# Patient Record
Sex: MALE | Race: WHITE | NOT HISPANIC OR LATINO | Employment: UNEMPLOYED | URBAN - METROPOLITAN AREA
[De-identification: names, ages, dates, MRNs, and addresses within clinical notes are randomized per-mention and may not be internally consistent; named-entity substitution may affect disease eponyms.]

---

## 2017-04-07 ENCOUNTER — HOSPITAL ENCOUNTER (OUTPATIENT)
Dept: INTERVENTIONAL RADIOLOGY/VASCULAR | Facility: HOSPITAL | Age: 37
Discharge: HOME | End: 2017-04-07
Attending: INTERNAL MEDICINE | Admitting: INTERNAL MEDICINE

## 2017-07-24 ENCOUNTER — HOSPITAL ENCOUNTER (OUTPATIENT)
Dept: CARDIOLOGY | Facility: HOSPITAL | Age: 37
Discharge: HOME | End: 2017-07-24
Attending: PHYSICIAN ASSISTANT | Admitting: PHYSICIAN ASSISTANT

## 2017-08-08 ENCOUNTER — HOSPITAL ENCOUNTER (OUTPATIENT)
Dept: PHYSICAL THERAPY | Facility: HOSPITAL | Age: 37
Setting detail: RECURRING SERIES
Discharge: HOME | End: 2017-09-12
Attending: PHYSICIAN ASSISTANT | Admitting: PHYSICIAN ASSISTANT

## 2018-02-22 ENCOUNTER — HOSPITAL ENCOUNTER (OUTPATIENT)
Dept: CARDIOLOGY | Facility: HOSPITAL | Age: 38
Discharge: HOME | End: 2018-02-22
Attending: INTERNAL MEDICINE | Admitting: INTERNAL MEDICINE

## 2018-05-24 ENCOUNTER — HOSPITAL ENCOUNTER (OUTPATIENT)
Dept: CARDIOLOGY | Facility: HOSPITAL | Age: 38
Discharge: HOME | End: 2018-05-24
Attending: PHYSICIAN ASSISTANT | Admitting: PHYSICIAN ASSISTANT

## 2018-12-31 ENCOUNTER — HOSPITAL ENCOUNTER (OUTPATIENT)
Dept: CARDIOLOGY | Facility: HOSPITAL | Age: 38
Discharge: HOME | End: 2018-12-31
Attending: PHYSICIAN ASSISTANT | Admitting: PHYSICIAN ASSISTANT

## 2019-09-30 RX ORDER — LANTHANUM CARBONATE 500 MG/1
TABLET, CHEWABLE ORAL EVERY 24 HOURS
COMMUNITY
Start: 2018-01-31 | End: 2019-10-08

## 2019-09-30 RX ORDER — SEVELAMER HYDROCHLORIDE 800 MG/1
TABLET, FILM COATED ORAL EVERY 8 HOURS
COMMUNITY
Start: 2018-01-31 | End: 2019-10-08

## 2019-09-30 RX ORDER — SODIUM BICARBONATE 650 MG/1
TABLET ORAL EVERY 12 HOURS
COMMUNITY
Start: 2018-01-31 | End: 2019-10-08

## 2019-09-30 RX ORDER — CINACALCET 90 MG/1
TABLET, FILM COATED ORAL EVERY 24 HOURS
COMMUNITY
Start: 2018-01-31 | End: 2019-10-08

## 2019-10-08 ENCOUNTER — OFFICE VISIT (OUTPATIENT)
Dept: CARDIOLOGY | Facility: CLINIC | Age: 39
End: 2019-10-08

## 2019-10-08 VITALS
HEIGHT: 65 IN | HEART RATE: 88 BPM | SYSTOLIC BLOOD PRESSURE: 98 MMHG | OXYGEN SATURATION: 98 % | BODY MASS INDEX: 25.49 KG/M2 | DIASTOLIC BLOOD PRESSURE: 60 MMHG | WEIGHT: 153 LBS

## 2019-10-08 DIAGNOSIS — N18.6 END-STAGE RENAL DISEASE (HCC): ICD-10-CM

## 2019-10-08 DIAGNOSIS — M79.602 ARM PAIN, LEFT: Primary | ICD-10-CM

## 2019-10-08 DIAGNOSIS — R07.9 CHEST PAIN, UNSPECIFIED TYPE: ICD-10-CM

## 2019-10-08 PROBLEM — R01.1 SYSTOLIC MURMUR: Status: ACTIVE | Noted: 2018-01-31

## 2019-10-08 PROCEDURE — 99213 OFFICE O/P EST LOW 20 MIN: CPT | Performed by: INTERNAL MEDICINE

## 2019-10-08 RX ORDER — MYCOPHENOLIC ACID 360 MG/1
360 TABLET, DELAYED RELEASE ORAL EVERY 12 HOURS SCHEDULED
COMMUNITY

## 2019-10-08 RX ORDER — TACROLIMUS 1 MG/1
0.5 CAPSULE ORAL 2 TIMES DAILY
COMMUNITY
End: 2022-09-28 | Stop reason: DRUGHIGH

## 2019-10-08 RX ORDER — SULFAMETHOXAZOLE AND TRIMETHOPRIM 400; 80 MG/1; MG/1
1 TABLET ORAL 2 TIMES DAILY
COMMUNITY
End: 2021-07-13

## 2019-10-08 RX ORDER — VALGANCICLOVIR 450 MG/1
450 TABLET, FILM COATED ORAL DAILY
COMMUNITY
End: 2021-07-13

## 2019-10-08 NOTE — PROGRESS NOTES
Encounter Date:10/08/2019  Plan seen 10/8/2018      Patient ID: Charanjit Parrish is a 39 y.o. male.    Chief Complaint:    Follow-up  Chest pain  Renal dysfunction  Systolic murmur    History of Present Illness  Patient had renal transplantation through [facility].  May 2019  Since I have last seen, the patient has been without any chest discomfort ,shortness of breath, palpitations, dizziness or syncope.  Denies having any headache ,abdominal pain ,nausea, vomiting , diarrhea constipation, loss of weight or loss of appetite.  Denies having any excessive bruising ,hematuria or blood in the stool.    Review of all systems negative except as indicated  Assessment and Plan       [[[[[[[[[[[[[[[[[[[  Impression  ==========   -chest pain -. -improved  Treadmill test 02/22/2018 is normal    -ESRD with left upper extremity fistula.  Patient is not on dialysis anymore.    -Status post renal transplantation through [facility].    -systolic murmur -likely due to AV fistula    - nonsmoker    - family history is negative for coronary artery disease    -no known allergies  ==============  Plan  ============  Chest pain has improved  EKG showed sinus rhythm without any ischemic changes  Medications were reviewed and updated.  Patient had renal transplantation.   no need for further cardiac workup at this time   followup in the office in 6 months.  [[[[[[[[[[[[[[[[           Diagnosis Plan   1. Arm pain, left     2. Chest pain, unspecified type     3. End-stage renal disease (CMS/[facility])     LAB RESULTS (LAST 7 DAYS)    CBC        BMP        CMP         BNP        TROPONIN        CoAg        Creatinine Clearance  CrCl cannot be calculated (No order found.).    ABG        Radiology  No radiology results for the last day                The following portions of the patient's history were reviewed and updated as appropriate: allergies, current medications, past family history, past medical history, past social history,  "past surgical history and problem list.    Review of Systems   Constitution: Negative for malaise/fatigue.   Cardiovascular: Negative for chest pain, leg swelling, palpitations and syncope.   Respiratory: Negative for shortness of breath.    Skin: Negative for rash.   Gastrointestinal: Negative for nausea and vomiting.   Neurological: Negative for dizziness, light-headedness and numbness.         Current Outpatient Medications:   •  Magnesium-Zinc (MAGNESIUM-CHELATED ZINC) 133.33-5 MG tablet, Take  by mouth., Disp: , Rfl:   •  mycophenolate (MYFORTIC) 360 MG tablet delayed-release EC tablet, Take 360 mg by mouth Every 12 (Twelve) Hours., Disp: , Rfl:   •  sulfamethoxazole-trimethoprim (BACTRIM,SEPTRA) 400-80 MG tablet, Take 1 tablet by mouth 2 (Two) Times a Day., Disp: , Rfl:   •  tacrolimus (PROGRAF) 1 MG capsule, Take 1 mg by mouth 2 (Two) Times a Day., Disp: , Rfl:   •  valGANciclovir (VALCYTE) 450 MG tablet, Take 450 mg by mouth Daily., Disp: , Rfl:     No Known Allergies    Family History   Problem Relation Age of Onset   • Stroke Father        Past Surgical History:   Procedure Laterality Date   • TRANSPLANTATION RENAL Right 05/07/2019       Past Medical History:   Diagnosis Date   • Chronic kidney disease        Family History   Problem Relation Age of Onset   • Stroke Father        Social History     Socioeconomic History   • Marital status: Single     Spouse name: Not on file   • Number of children: Not on file   • Years of education: Not on file   • Highest education level: Not on file   Tobacco Use   • Smoking status: Current Every Day Smoker     Types: Cigarettes         Procedures      Objective:       Physical Exam    BP 98/60 (BP Location: Right arm, Patient Position: Sitting, Cuff Size: Large Adult)   Pulse 88   Ht 165.1 cm (65\")   Wt 69.4 kg (153 lb)   SpO2 98%   BMI 25.46 kg/m²   The patient is alert, oriented and in no distress.    Vital signs as noted above.    Head and neck revealed no " carotid bruits or jugular venous distension.  No thyromegaly or lymphadenopathy is present.    Lungs clear.  No wheezing.  Breath sounds are normal bilaterally.    Heart normal first and second heart sounds.  Grade 2/6 to 3/6 systolic murmur.  no pericardial rub is present.  No gallop is present.    Abdomen soft and nontender.  No organomegaly is present.    Extremities revealed good peripheral pulses without any pedal edema.  Left upper extremity AV fistula in place.    Skin warm and dry.    Musculoskeletal system is grossly normal.    CNS grossly normal.

## 2020-06-11 ENCOUNTER — OFFICE VISIT (OUTPATIENT)
Dept: CARDIOLOGY | Facility: CLINIC | Age: 40
End: 2020-06-11

## 2020-06-11 VITALS
BODY MASS INDEX: 26.66 KG/M2 | HEART RATE: 81 BPM | SYSTOLIC BLOOD PRESSURE: 115 MMHG | WEIGHT: 160 LBS | HEIGHT: 65 IN | OXYGEN SATURATION: 97 % | DIASTOLIC BLOOD PRESSURE: 68 MMHG

## 2020-06-11 DIAGNOSIS — N18.6 END-STAGE RENAL DISEASE (HCC): Primary | ICD-10-CM

## 2020-06-11 DIAGNOSIS — R07.9 CHEST PAIN, UNSPECIFIED TYPE: ICD-10-CM

## 2020-06-11 PROCEDURE — 93000 ELECTROCARDIOGRAM COMPLETE: CPT | Performed by: INTERNAL MEDICINE

## 2020-06-11 PROCEDURE — 99213 OFFICE O/P EST LOW 20 MIN: CPT | Performed by: INTERNAL MEDICINE

## 2020-06-11 NOTE — PROGRESS NOTES
Encounter Date:06/11/2020  Last seen 10/8/2019      Patient ID: Charanjit Parrish is a 40 y.o. male.    Chief Complaint:    Chest pain  Renal dysfunction  Systolic murmur     History of Present Illness    Patient had renal transplantation through [facility][1].  May 2019    Since I have last seen, the patient has been without any chest discomfort ,shortness of breath, palpitations, dizziness or syncope.  Denies having any headache ,abdominal pain ,nausea, vomiting , diarrhea constipation, loss of weight or loss of appetite.  Denies having any excessive bruising ,hematuria or blood in the stool.     Review of all systems negative except as indicated  Assessment and Plan         [[[[[[[[[[[[[[[[[[[  Impression  ==========   -chest pain -. -improved  Treadmill test 02/22/2018 is normal     -ESRD with left upper extremity fistula.  Patient is not on dialysis anymore.  -Status post renal transplantation through [facility][1].  May 2019     -systolic murmur -likely due to AV fistula     - nonsmoker     - family history is negative for coronary artery disease     -no known allergies  ==============  Plan  ============  Chest pain has improved  EKG showed sinus rhythm without any ischemic changes  Medications were reviewed and updated.  Patient had renal transplantation.  no need for further cardiac workup at this time  followup in the office in  1 year unless he is having problems in the interim  [[[[[[[[[[[[[[[[                 Diagnosis Plan   1. End-stage renal disease (CMS/HCC)     2. Chest pain, unspecified type     LAB RESULTS (LAST 7 DAYS)    CBC        BMP        CMP         BNP        TROPONIN        CoAg        Creatinine Clearance  CrCl cannot be calculated (No successful lab value found.).    ABG        Radiology  No radiology results for the last day                The following portions of the patient's history were reviewed and updated as appropriate: allergies, current medications, past family  history, past medical history, past social history, past surgical history and problem list.    Review of Systems   Constitution: Negative for malaise/fatigue.   Cardiovascular: Negative for chest pain, leg swelling, palpitations and syncope.   Respiratory: Negative for shortness of breath.    Skin: Negative for rash.   Gastrointestinal: Negative for nausea and vomiting.   Neurological: Negative for dizziness, light-headedness and numbness.         Current Outpatient Medications:   •  Magnesium-Zinc (MAGNESIUM-CHELATED ZINC) 133.33-5 MG tablet, Take  by mouth., Disp: , Rfl:   •  mycophenolate (MYFORTIC) 360 MG tablet delayed-release EC tablet, Take 360 mg by mouth Every 12 (Twelve) Hours., Disp: , Rfl:   •  sulfamethoxazole-trimethoprim (BACTRIM,SEPTRA) 400-80 MG tablet, Take 1 tablet by mouth 2 (Two) Times a Day., Disp: , Rfl:   •  tacrolimus (PROGRAF) 1 MG capsule, Take 1 mg by mouth 2 (Two) Times a Day., Disp: , Rfl:   •  valGANciclovir (VALCYTE) 450 MG tablet, Take 450 mg by mouth Daily., Disp: , Rfl:     No Known Allergies    Family History   Problem Relation Age of Onset   • Stroke Father        Past Surgical History:   Procedure Laterality Date   • TRANSPLANTATION RENAL Right 05/07/2019       Past Medical History:   Diagnosis Date   • Chronic kidney disease        Family History   Problem Relation Age of Onset   • Stroke Father        Social History     Socioeconomic History   • Marital status: Single     Spouse name: Not on file   • Number of children: Not on file   • Years of education: Not on file   • Highest education level: Not on file   Tobacco Use   • Smoking status: Former Smoker     Types: Cigarettes   • Smokeless tobacco: Never Used           ECG 12 Lead  Date/Time: 6/11/2020 3:52 PM  Performed by: Ruthy Black MD  Authorized by: Ruthy Black MD   Comparison: compared with previous ECG   Similar to previous ECG  Comments: Normal sinus rhythm normal ECG 75/min normal axis normal intervals no  "ectopy no change from previous EKG              Objective:       Physical Exam    /68 (BP Location: Right arm, Patient Position: Sitting, Cuff Size: Large Adult)   Pulse 81   Ht 165.1 cm (65\")   Wt 72.6 kg (160 lb)   SpO2 97%   BMI 26.63 kg/m²   The patient is alert, oriented and in no distress.    Vital signs as noted above.    Head and neck revealed no carotid bruits or jugular venous distension.  No thyromegaly or lymphadenopathy is present.    Lungs clear.  No wheezing.  Breath sounds are normal bilaterally.    Heart normal first and second heart sounds.  Grade 2/6 to 3/6 systolic murmur..  No pericardial rub is present.  No gallop is present.    Abdomen soft and nontender.  No organomegaly is present.    Extremities revealed good peripheral pulses without any pedal edema.    Skin warm and dry.    Musculoskeletal system is grossly normal.    CNS grossly normal.        "

## 2021-07-13 ENCOUNTER — OFFICE VISIT (OUTPATIENT)
Dept: CARDIOLOGY | Facility: CLINIC | Age: 41
End: 2021-07-13

## 2021-07-13 VITALS
SYSTOLIC BLOOD PRESSURE: 113 MMHG | HEART RATE: 77 BPM | OXYGEN SATURATION: 96 % | WEIGHT: 142.25 LBS | DIASTOLIC BLOOD PRESSURE: 71 MMHG | HEIGHT: 65 IN | BODY MASS INDEX: 23.7 KG/M2

## 2021-07-13 DIAGNOSIS — Z94.0 HISTORY OF RENAL TRANSPLANTATION: ICD-10-CM

## 2021-07-13 DIAGNOSIS — N18.6 END-STAGE RENAL DISEASE (HCC): Primary | ICD-10-CM

## 2021-07-13 DIAGNOSIS — R07.9 CHEST PAIN, UNSPECIFIED TYPE: ICD-10-CM

## 2021-07-13 DIAGNOSIS — M79.602 ARM PAIN, LEFT: ICD-10-CM

## 2021-07-13 PROCEDURE — 99213 OFFICE O/P EST LOW 20 MIN: CPT | Performed by: INTERNAL MEDICINE

## 2021-07-13 PROCEDURE — 93000 ELECTROCARDIOGRAM COMPLETE: CPT | Performed by: INTERNAL MEDICINE

## 2021-07-13 RX ORDER — MONTELUKAST SODIUM 10 MG/1
1 TABLET ORAL DAILY
COMMUNITY
Start: 2021-07-05

## 2021-07-13 RX ORDER — ISAVUCONAZONIUM SULFATE 186 MG/1
1 CAPSULE ORAL DAILY
COMMUNITY
Start: 2021-07-06

## 2021-07-13 RX ORDER — MAGNESIUM OXIDE 400 MG/1
1 TABLET ORAL DAILY
COMMUNITY
Start: 2021-07-05

## 2021-07-13 NOTE — PROGRESS NOTES
Encounter Date:07/13/2021  Last seen 6/11/2020      Patient ID: Charanjit Parrish is a 41 y.o. male.    Chief Complaint:  Chest pain  Renal dysfunction  Systolic murmur     History of Present Illness     Since I have last seen, the patient has been without any chest discomfort ,shortness of breath, palpitations, dizziness or syncope.  Denies having any headache ,abdominal pain ,nausea, vomiting , diarrhea constipation, loss of weight or loss of appetite.  Denies having any excessive bruising ,hematuria or blood in the stool.    Review of all systems negative except as indicated.    Reviewed ROS.    Assessment and Plan         [[[[[[[[[[[[[[[[[[[  Impression  ==========   -chest pain -. -improved  Treadmill test 02/22/2018 is normal     -ESRD with left upper extremity fistula.  Patient is not on dialysis anymore.    -Status post renal transplantation through [facility].  May 2019     -systolic murmur -likely due to AV fistula     - nonsmoker     - family history is negative for coronary artery disease     -no known allergies  ==============  Plan  ============  Chest pain and left arm pain has improved  EKG showed sinus rhythm without any ischemic changes    Patient had renal transplantation.  Patient has AV fistula but not using it.    Medications are reviewed and updated.  no need for further cardiac workup at this time  followup in the office in  1 year unless he is having problems in the interim.    Further plan will depend on patient's progress.  [[[[[[[[[[[[[[[[                     Diagnosis Plan   1. End-stage renal disease (CMS/HCC)     2. Chest pain, unspecified type     3. Arm pain, left     4. History of renal transplantation     LAB RESULTS (LAST 7 DAYS)    CBC        BMP        CMP         BNP        TROPONIN        CoAg        Creatinine Clearance  CrCl cannot be calculated (No successful lab value found.).    ABG        Radiology  No radiology results for the last day                The  following portions of the patient's history were reviewed and updated as appropriate: allergies, current medications, past family history, past medical history, past social history, past surgical history and problem list.    Review of Systems   Constitutional: Negative for fever and malaise/fatigue.   HENT: Negative for congestion and hearing loss.    Eyes: Negative for double vision and visual disturbance.   Cardiovascular: Negative for chest pain, claudication, dyspnea on exertion, leg swelling and syncope.   Respiratory: Negative for cough and shortness of breath.    Endocrine: Negative for cold intolerance.   Skin: Negative for color change and rash.   Musculoskeletal: Negative for arthritis and joint pain.   Gastrointestinal: Negative for abdominal pain and heartburn.   Genitourinary: Negative for hematuria.   Neurological: Negative for excessive daytime sleepiness and dizziness.   Psychiatric/Behavioral: Negative for depression. The patient is not nervous/anxious.    All other systems reviewed and are negative.        Current Outpatient Medications:   •  Cresemba 186 MG capsule capsule, Take 1 capsule by mouth Daily. For 30 days, Disp: , Rfl:   •  magnesium oxide (MAG-OX) 400 MG tablet, Take 1 tablet by mouth Daily., Disp: , Rfl:   •  montelukast (SINGULAIR) 10 MG tablet, Take 1 tablet by mouth Daily., Disp: , Rfl:   •  mycophenolate (MYFORTIC) 360 MG tablet delayed-release EC tablet, Take 360 mg by mouth Every 12 (Twelve) Hours., Disp: , Rfl:   •  tacrolimus (PROGRAF) 1 MG capsule, Take 0.5 mg by mouth 2 (Two) Times a Day., Disp: , Rfl:     No Known Allergies    Family History   Problem Relation Age of Onset   • Stroke Father        Past Surgical History:   Procedure Laterality Date   • TRANSPLANTATION RENAL Right 05/07/2019       Past Medical History:   Diagnosis Date   • Chronic kidney disease        Family History   Problem Relation Age of Onset   • Stroke Father        Social History     Socioeconomic  "History   • Marital status: Single     Spouse name: Not on file   • Number of children: Not on file   • Years of education: Not on file   • Highest education level: Not on file   Tobacco Use   • Smoking status: Former Smoker     Types: Cigarettes   • Smokeless tobacco: Never Used   Vaping Use   • Vaping Use: Never used   Substance and Sexual Activity   • Alcohol use: Never   • Drug use: Never           ECG 12 Lead    Date/Time: 7/13/2021 1:52 PM  Performed by: Ruthy Black MD  Authorized by: Ruthy Black MD   Comparison: compared with previous ECG   Similar to previous ECG  Comparison to previous ECG: Normal sinus rhythm normal ECG 73/min normal axis normal intervals no ectopy no significant change from 6/11/2020                Objective:       Physical Exam    /71 (BP Location: Right arm, Patient Position: Sitting, Cuff Size: Adult)   Pulse 77   Ht 165.1 cm (65\")   Wt 64.5 kg (142 lb 4 oz)   SpO2 96%   BMI 23.67 kg/m²   The patient is alert, oriented and in no distress.    Vital signs as noted above.    Head and neck revealed no carotid bruits or jugular venous distension.  No thyromegaly or lymphadenopathy is present.    Lungs clear.  No wheezing.  Breath sounds are normal bilaterally.    Heart normal first and second heart sounds.  No murmur..  No pericardial rub is present.  No gallop is present.    Abdomen soft and nontender.  No organomegaly is present.    Extremities revealed good peripheral pulses without any pedal edema.  Left upper extremity AV fistula    Skin warm and dry.    Musculoskeletal system is grossly normal.    CNS grossly normal.    Reviewed and unchanged from last visit.        "

## 2022-09-23 NOTE — PROGRESS NOTES
Encounter Date:09/28/2022    Last seen 7/13/2021      Patient ID: Charanjit Parrish is a 42 y.o. male.    Chief Complaint:    Chest pain  Renal dysfunction  Systolic murmur     History of Present Illness     Since I have last seen, the patient has been without any chest discomfort ,shortness of breath, palpitations, dizziness or syncope.  Denies having any headache ,abdominal pain ,nausea, vomiting , diarrhea constipation, loss of weight or loss of appetite.  Denies having any excessive bruising ,hematuria or blood in the stool.    Review of all systems negative except as indicated.    Reviewed ROS.  Assessment and Plan         [[[[[[[[[[[[[[[[[[[  Impression  ==========   -chest pain -. -improved  Treadmill test 02/22/2018 is normal      -Status post renal transplantation through [facility].  May 2019    -History of ESRD with left upper extremity fistula.  Patient is not on dialysis anymore.    -systolic murmur -likely due to AV fistula     - nonsmoker     - family history is negative for coronary artery disease     -no known allergies  ==============  Plan  ============  Chest pain and left arm pain has improved  EKG showed sinus rhythm without any ischemic changes-9/28/2022     Patient had renal transplantation.  Patient has AV fistula but not using it.  Patient is off dialysis.     Medications are reviewed and updated.      followup in the office in  1 year unless he is having problems in the interim.     Further plan will depend on patient's progress.  [[[[[[[[[[[[[[[[                   Diagnosis Plan   1. End-stage renal disease (HCC)  ECG 12 Lead   2. Chest pain, unspecified type  ECG 12 Lead   3. History of renal transplantation  ECG 12 Lead   LAB RESULTS (LAST 7 DAYS)    CBC        BMP        CMP         BNP        TROPONIN        CoAg        Creatinine Clearance  CrCl cannot be calculated (No successful lab value found.).    ABG        Radiology  No radiology results for the last  day                The following portions of the patient's history were reviewed and updated as appropriate: allergies, current medications, past family history, past medical history, past social history, past surgical history and problem list.    Review of Systems   Constitutional: Negative for fever and malaise/fatigue.   Cardiovascular: Negative for chest pain, dyspnea on exertion and palpitations.   Respiratory: Negative for cough and shortness of breath.    Skin: Negative for rash.   Gastrointestinal: Negative for abdominal pain, nausea and vomiting.   Neurological: Negative for focal weakness and headaches.   All other systems reviewed and are negative.        Current Outpatient Medications:   •  Cresemba 186 MG capsule capsule, Take 1 capsule by mouth Daily. For 30 days, Disp: , Rfl:   •  magnesium oxide (MAG-OX) 400 MG tablet, Take 1 tablet by mouth Daily., Disp: , Rfl:   •  montelukast (SINGULAIR) 10 MG tablet, Take 1 tablet by mouth Daily., Disp: , Rfl:   •  mycophenolate (MYFORTIC) 360 MG tablet delayed-release EC tablet, Take 360 mg by mouth Every 12 (Twelve) Hours., Disp: , Rfl:   •  rosuvastatin (CRESTOR) 10 MG tablet, Take 10 mg by mouth every night at bedtime., Disp: , Rfl:   •  tacrolimus (PROGRAF) 0.5 MG capsule, TAKE 2 CAPSULES BY MOUTH EVERY MORNING AND 1 EVERY EVENING., Disp: , Rfl:     No Known Allergies    Family History   Problem Relation Age of Onset   • Stroke Father        Past Surgical History:   Procedure Laterality Date   • TRANSPLANTATION RENAL Right 05/07/2019       Past Medical History:   Diagnosis Date   • Chronic kidney disease        Family History   Problem Relation Age of Onset   • Stroke Father        Social History     Socioeconomic History   • Marital status: Single   Tobacco Use   • Smoking status: Former Smoker     Types: Cigarettes   • Smokeless tobacco: Never Used   Vaping Use   • Vaping Use: Never used   Substance and Sexual Activity   • Alcohol use: Never   • Drug use:  "Never           ECG 12 Lead    Date/Time: 9/28/2022 3:30 PM  Performed by: Ruthy Black MD  Authorized by: Ruthy Black MD   Comparison: compared with previous ECG   Similar to previous ECG  Comparison to previous ECG: Normal sinus rhythm nonspecific ST-T wave changes normal axis normal intervals no ectopy no significant change from 7/13/2021                Objective:       Physical Exam    /86 (BP Location: Left arm, Patient Position: Sitting, Cuff Size: Adult)   Pulse 78   Ht 165.1 cm (65\")   Wt 74 kg (163 lb 3.2 oz)   SpO2 97%   BMI 27.16 kg/m²   The patient is alert, oriented and in no distress.    Vital signs as noted above.    Head and neck revealed no carotid bruits or jugular venous distension.  No thyromegaly or lymphadenopathy is present.    Lungs clear.  No wheezing.  Breath sounds are normal bilaterally.    Heart normal first and second heart sounds.  Grade 2/6 systolic murmur..  No pericardial rub is present.  No gallop is present.    Abdomen soft and nontender.  No organomegaly is present.    Extremities revealed good peripheral pulses without any pedal edema.    Skin warm and dry.    Musculoskeletal system is grossly normal.    CNS grossly normal.    Reviewed and updated.        "

## 2022-09-28 ENCOUNTER — OFFICE VISIT (OUTPATIENT)
Dept: CARDIOLOGY | Facility: CLINIC | Age: 42
End: 2022-09-28

## 2022-09-28 VITALS
DIASTOLIC BLOOD PRESSURE: 86 MMHG | OXYGEN SATURATION: 97 % | HEART RATE: 78 BPM | HEIGHT: 65 IN | WEIGHT: 163.2 LBS | BODY MASS INDEX: 27.19 KG/M2 | SYSTOLIC BLOOD PRESSURE: 143 MMHG

## 2022-09-28 DIAGNOSIS — R07.9 CHEST PAIN, UNSPECIFIED TYPE: ICD-10-CM

## 2022-09-28 DIAGNOSIS — Z94.0 HISTORY OF RENAL TRANSPLANTATION: ICD-10-CM

## 2022-09-28 DIAGNOSIS — N18.6 END-STAGE RENAL DISEASE: Primary | ICD-10-CM

## 2022-09-28 PROCEDURE — 99213 OFFICE O/P EST LOW 20 MIN: CPT | Performed by: INTERNAL MEDICINE

## 2022-09-28 PROCEDURE — 93000 ELECTROCARDIOGRAM COMPLETE: CPT | Performed by: INTERNAL MEDICINE

## 2022-09-28 RX ORDER — TACROLIMUS 0.5 MG/1
CAPSULE ORAL
COMMUNITY
Start: 2022-09-17

## 2022-09-28 RX ORDER — ROSUVASTATIN CALCIUM 10 MG/1
10 TABLET, COATED ORAL
COMMUNITY
Start: 2022-08-21

## 2023-09-10 NOTE — PROGRESS NOTES
Encounter Date:09/28/2023      Last seen 9/28/2022    Patient ID: Charanjit Parrish is a 43 y.o. male.    Chief Complaint:    Chest pain  Renal dysfunction  Systolic murmur  Status post renal transplant     History of Present Illness     Since I have last seen, the patient has been without any chest discomfort ,shortness of breath, palpitations, dizziness or syncope.  Denies having any headache ,abdominal pain ,nausea, vomiting , diarrhea constipation, loss of weight or loss of appetite.  Denies having any excessive bruising ,hematuria or blood in the stool.    Review of all systems negative except as indicated.    Reviewed ROS.  Assessment and Plan         [[[[[[[[[[[[[[[[[[[  Impression  ==========   -chest pain -. -improved  Treadmill test 02/22/2018 is normal      -Status post renal transplantation through [facility].  May 2019     -History of ESRD with left upper extremity fistula.  (AV fistula was removed)  Patient is not on dialysis anymore.     -History of systolic murmur -likely due to AV fistula     - nonsmoker     - family history is negative for coronary artery disease     -no known allergies  ==============  Plan  ============  Chest pain and left arm pain has improved  EKG showed sinus rhythm without any ischemic changes-9/28/2023    Patient had renal transplantation.  Patient has AV fistula but not using it.  AV fistula was removed.  Patient is off dialysis.     Medications are reviewed and updated.     followup in the office in  1 year unless he is having problems in the interim.     Further plan will depend on patient's progress.    Reviewed and updated-9/28/2023  [[[[[[[[[[[[[[[[                        Diagnosis Plan   1. End-stage renal disease        2. Chest pain, unspecified type        3. History of renal transplantation        LAB RESULTS (LAST 7 DAYS)    CBC        BMP        CMP         BNP        TROPONIN        CoAg        Creatinine Clearance  CrCl cannot be calculated (No  successful lab value found.).    ABG        Radiology  No radiology results for the last day                The following portions of the patient's history were reviewed and updated as appropriate: allergies, current medications, past family history, past medical history, past social history, past surgical history, and problem list.    Review of Systems   Constitutional: Negative for malaise/fatigue.   Cardiovascular:  Negative for chest pain, dyspnea on exertion, leg swelling and palpitations.   Respiratory:  Negative for cough and shortness of breath.    Gastrointestinal:  Negative for abdominal pain, nausea and vomiting.   Neurological:  Negative for dizziness, focal weakness, headaches, light-headedness and numbness.   All other systems reviewed and are negative.      Current Outpatient Medications:     Cresemba 186 MG capsule capsule, Take 1 capsule by mouth Daily. For 30 days, Disp: , Rfl:     magnesium oxide (MAG-OX) 400 MG tablet, Take 1 tablet by mouth Daily., Disp: , Rfl:     montelukast (SINGULAIR) 10 MG tablet, Take 1 tablet by mouth Daily., Disp: , Rfl:     mycophenolate (MYFORTIC) 360 MG tablet delayed-release EC tablet, Take 360 mg by mouth Every 12 (Twelve) Hours., Disp: , Rfl:     rosuvastatin (CRESTOR) 10 MG tablet, Take 10 mg by mouth every night at bedtime., Disp: , Rfl:     tacrolimus (PROGRAF) 0.5 MG capsule, TAKE 2 CAPSULES BY MOUTH EVERY MORNING AND 1 EVERY EVENING., Disp: , Rfl:     No Known Allergies    Family History   Problem Relation Age of Onset    Stroke Father        Past Surgical History:   Procedure Laterality Date    TRANSPLANTATION RENAL Right 05/07/2019       Past Medical History:   Diagnosis Date    Chronic kidney disease        Family History   Problem Relation Age of Onset    Stroke Father        Social History     Socioeconomic History    Marital status: Single   Tobacco Use    Smoking status: Former     Types: Cigarettes    Smokeless tobacco: Never   Vaping Use    Vaping  Use: Never used   Substance and Sexual Activity    Alcohol use: Never    Drug use: Never           ECG 12 Lead    Date/Time: 9/28/2023 10:55 AM  Performed by: Rtuhy Black MD  Authorized by: Ruthy Black MD   Comparison: compared with previous ECG   Similar to previous ECG  Comparison to previous ECG: Sinus rhythm first-degree AV block nonspecific intraventricular conduction delay 70/min no ectopy no significant change from previous EKG.        Objective:       Physical Exam    There were no vitals taken for this visit.  The patient is alert, oriented and in no distress.    Vital signs as noted above.    Head and neck revealed no carotid bruits or jugular venous distension.  No thyromegaly or lymphadenopathy is present.    Lungs clear.  No wheezing.  Breath sounds are normal bilaterally.    Heart normal first and second heart sounds.  No murmur..  No pericardial rub is present.  No gallop is present.    Abdomen soft and nontender.  No organomegaly is present.    Extremities revealed good peripheral pulses without any pedal edema.    Skin warm and dry.    Musculoskeletal system is grossly normal.    CNS grossly normal.    Reviewed and updated.

## 2023-09-28 ENCOUNTER — OFFICE VISIT (OUTPATIENT)
Dept: CARDIOLOGY | Facility: CLINIC | Age: 43
End: 2023-09-28
Payer: MEDICAID

## 2023-09-28 VITALS
DIASTOLIC BLOOD PRESSURE: 87 MMHG | WEIGHT: 169 LBS | SYSTOLIC BLOOD PRESSURE: 138 MMHG | HEIGHT: 65 IN | HEART RATE: 72 BPM | BODY MASS INDEX: 28.16 KG/M2 | OXYGEN SATURATION: 97 %

## 2023-09-28 DIAGNOSIS — Z94.0 HISTORY OF RENAL TRANSPLANTATION: ICD-10-CM

## 2023-09-28 DIAGNOSIS — N18.6 END-STAGE RENAL DISEASE: Primary | ICD-10-CM

## 2023-09-28 DIAGNOSIS — R07.9 CHEST PAIN, UNSPECIFIED TYPE: ICD-10-CM

## 2023-09-28 RX ORDER — ROSUVASTATIN CALCIUM 20 MG/1
1 TABLET, COATED ORAL DAILY
COMMUNITY
Start: 2023-09-11

## 2023-09-28 RX ORDER — AMLODIPINE BESYLATE 5 MG/1
1 TABLET ORAL DAILY
COMMUNITY
Start: 2023-06-17

## 2024-10-29 NOTE — PROGRESS NOTES
Encounter Date:11/13/2024  Last seen 9/28/2023      Patient ID: Charanjit Parrish is a 44 y.o. male.    Chief Complaint:     Chest pain  Renal dysfunction  Systolic murmur  Status post renal transplant     History of Present Illness     Since I have last seen, the patient has been without any chest discomfort ,shortness of breath, palpitations, dizziness or syncope.  Denies having any headache ,abdominal pain ,nausea, vomiting , diarrhea constipation, loss of weight or loss of appetite.  Denies having any excessive bruising ,hematuria or blood in the stool.    Review of all systems negative except as indicated.    Reviewed ROS.    Assessment and Plan         [[[[[[[[[[[[[[[[[[[  Impression  ==========   -chest pain -. -improved  Treadmill test 02/22/2018 is normal      -Status post renal transplantation through [facility].  May 2019     -History of ESRD with left upper extremity fistula.  (AV fistula was removed)  Patient is not on dialysis anymore.     -History of systolic murmur -likely due to AV fistula    -Dyslipidemia hypertension     - nonsmoker     - family history is negative for coronary artery disease     -no known allergies  ==============  Plan  ============  Chest pain and left arm pain has improved  EKG showed sinus rhythm without any ischemic changes-9/28/2023     Patient had renal transplantation.  Patient has AV fistula but not using it.  AV fistula was removed.  Patient is off dialysis.    Dyslipidemia-on Crestor.    Hypertension-on amlodipine.    Medications are reviewed and updated.     followup in the office in  1 year unless he is having problems in the interim.     Further plan will depend on patient's progress.     Reviewed and updated 11/13/2024.  [[[[[[[[[[[[[[[[                  Diagnosis Plan   1. Chest pain, unspecified type        2. End-stage renal disease        3. Systolic murmur        LAB RESULTS (LAST 7 DAYS)    CBC        BMP        CMP         BNP        TROPONIN         CoAg        Creatinine Clearance  CrCl cannot be calculated (No successful lab value found.).    ABG        Radiology  No radiology results for the last day                The following portions of the patient's history were reviewed and updated as appropriate: allergies, current medications, past family history, past medical history, past social history, past surgical history, and problem list.    Review of Systems   Constitutional: Negative for malaise/fatigue.   Cardiovascular:  Negative for chest pain, dyspnea on exertion, leg swelling and palpitations.   Respiratory:  Negative for cough and shortness of breath.    Gastrointestinal:  Negative for abdominal pain, nausea and vomiting.   Neurological:  Negative for dizziness, focal weakness, headaches, light-headedness and numbness.   All other systems reviewed and are negative.      Current Outpatient Medications:     amLODIPine (NORVASC) 5 MG tablet, Take 1 tablet by mouth Daily., Disp: , Rfl:     Cresemba 186 MG capsule capsule, Take 1 capsule by mouth Daily. For 30 days, Disp: , Rfl:     magnesium oxide (MAG-OX) 400 MG tablet, Take 1 tablet by mouth Daily., Disp: , Rfl:     montelukast (SINGULAIR) 10 MG tablet, Take 1 tablet by mouth Daily., Disp: , Rfl:     mycophenolate (MYFORTIC) 360 MG tablet delayed-release EC tablet, Take 1 tablet by mouth Every 12 (Twelve) Hours., Disp: , Rfl:     rosuvastatin (CRESTOR) 20 MG tablet, Take 1 tablet by mouth Daily., Disp: , Rfl:     tacrolimus (PROGRAF) 0.5 MG capsule, TAKE 2 CAPSULES BY MOUTH EVERY MORNING AND 1 EVERY EVENING., Disp: , Rfl:     No Known Allergies    Family History   Problem Relation Age of Onset    Stroke Father        Past Surgical History:   Procedure Laterality Date    TRANSPLANTATION RENAL Right 05/07/2019       Past Medical History:   Diagnosis Date    Chronic kidney disease        Family History   Problem Relation Age of Onset    Stroke Father        Social History     Socioeconomic History     Marital status: Single   Tobacco Use    Smoking status: Former     Types: Cigarettes     Passive exposure: Past    Smokeless tobacco: Never   Vaping Use    Vaping status: Never Used   Substance and Sexual Activity    Alcohol use: Never    Drug use: Never    Sexual activity: Defer           ECG 12 Lead    Date/Time: 11/13/2024 1:02 PM  Performed by: Ruthy Black MD    Authorized by: Ruthy Black MD  Comparison: compared with previous ECG   Similar to previous ECG  Comparison to previous ECG: Normal sinus rhythm first-degree AV block 66/min nonspecific ST-T wave changes normal axis no ectopy no significant change from previous EKG.        Objective:       Physical Exam    There were no vitals taken for this visit.  The patient is alert, oriented and in no distress.    Vital signs as noted above.    Head and neck revealed no carotid bruits or jugular venous distension.  No thyromegaly or lymphadenopathy is present.    Lungs clear.  No wheezing.  Breath sounds are normal bilaterally.    Heart normal first and second heart sounds.  Grade 2 or 6 systolic murmur..  No pericardial rub is present.  No gallop is present.    Abdomen soft and nontender.  No organomegaly is present.    Extremities revealed good peripheral pulses without any pedal edema.    Skin warm and dry.    Musculoskeletal system is grossly normal.    CNS grossly normal.    Reviewed and updated.

## 2024-11-13 ENCOUNTER — OFFICE VISIT (OUTPATIENT)
Dept: CARDIOLOGY | Facility: CLINIC | Age: 44
End: 2024-11-13
Payer: MEDICAID

## 2024-11-13 VITALS
HEART RATE: 72 BPM | BODY MASS INDEX: 29.66 KG/M2 | OXYGEN SATURATION: 96 % | SYSTOLIC BLOOD PRESSURE: 134 MMHG | DIASTOLIC BLOOD PRESSURE: 83 MMHG | WEIGHT: 178 LBS | HEIGHT: 65 IN

## 2024-11-13 DIAGNOSIS — R07.9 CHEST PAIN, UNSPECIFIED TYPE: Primary | ICD-10-CM

## 2024-11-13 DIAGNOSIS — R01.1 SYSTOLIC MURMUR: ICD-10-CM

## 2024-11-13 DIAGNOSIS — N18.6 END-STAGE RENAL DISEASE: ICD-10-CM
